# Patient Record
Sex: MALE | Employment: FULL TIME | URBAN - METROPOLITAN AREA
[De-identification: names, ages, dates, MRNs, and addresses within clinical notes are randomized per-mention and may not be internally consistent; named-entity substitution may affect disease eponyms.]

---

## 2018-05-10 ENCOUNTER — OFFICE VISIT (OUTPATIENT)
Dept: FAMILY MEDICINE | Facility: CLINIC | Age: 33
End: 2018-05-10
Payer: COMMERCIAL

## 2018-05-10 VITALS
SYSTOLIC BLOOD PRESSURE: 132 MMHG | DIASTOLIC BLOOD PRESSURE: 73 MMHG | BODY MASS INDEX: 31.78 KG/M2 | HEIGHT: 71 IN | WEIGHT: 227 LBS | HEART RATE: 89 BPM

## 2018-05-10 DIAGNOSIS — M21.42 PES PLANUS OF BOTH FEET: ICD-10-CM

## 2018-05-10 DIAGNOSIS — K21.9 GASTROESOPHAGEAL REFLUX DISEASE WITHOUT ESOPHAGITIS: ICD-10-CM

## 2018-05-10 DIAGNOSIS — M21.41 PES PLANUS OF BOTH FEET: ICD-10-CM

## 2018-05-10 DIAGNOSIS — M50.90 CERVICAL DISC DISEASE: ICD-10-CM

## 2018-05-10 DIAGNOSIS — E04.1 THYROID CYST: ICD-10-CM

## 2018-05-10 DIAGNOSIS — E55.9 VITAMIN D DEFICIENCY: ICD-10-CM

## 2018-05-10 DIAGNOSIS — R00.0 TACHYCARDIA: Primary | ICD-10-CM

## 2018-05-10 DIAGNOSIS — Z13.220 SCREENING FOR LIPID DISORDERS: ICD-10-CM

## 2018-05-10 DIAGNOSIS — Z13.1 SCREENING FOR DIABETES MELLITUS: ICD-10-CM

## 2018-05-10 PROBLEM — M21.40 FLAT FOOT: Status: ACTIVE | Noted: 2018-05-10

## 2018-05-10 PROCEDURE — 3008F BODY MASS INDEX DOCD: CPT | Mod: ,,, | Performed by: INTERNAL MEDICINE

## 2018-05-10 PROCEDURE — 99204 OFFICE O/P NEW MOD 45 MIN: CPT | Mod: ,,, | Performed by: INTERNAL MEDICINE

## 2018-05-10 RX ORDER — CALCIUM CARB/VITAMIN D3/VIT K1 500-500-40
400 TABLET,CHEWABLE ORAL DAILY
Qty: 1 CAPSULE | Refills: 1
Start: 2018-05-10 | End: 2018-07-04 | Stop reason: DRUGHIGH

## 2018-05-10 RX ORDER — ERGOCALCIFEROL 1.25 MG/1
50000 CAPSULE ORAL
COMMUNITY
End: 2018-05-10 | Stop reason: CLARIF

## 2018-05-10 NOTE — PATIENT INSTRUCTIONS
Nonsurgical Treatment of Cervical Spine Problems  Cervical spine problems can often be treated without surgery. Choices include rest, medicines, or injections. Your healthcare provider may also suggest physical therapy or certain exercises. These may all help to relieve your symptoms. These treatments are often successful. But if your symptoms dont subside, talk to your healthcare provider. He or she may tell you that surgery is your best choice.  Relieving your symptoms  Your healthcare provider may recommend:  · Medicines. These help to reduce pain and inflammation.  · Epidural steroid injections. These are injections into the spinal canal near the spinal cord. They may relieve severe pain and reduce inflammation.  · Restricting aggravating activities. This can help to give your cervical spine time to heal.  · A soft cervical collar. This can help to support your head while keeping your cervical spine aligned.  · Traction. This may help relieve pressure on the irritated nerves.  Restoring mobility and strength  Your healthcare provider may recommend that you work with a physical therapist. This can help you regain mobility and strength in your neck. Physical therapy may last for 4 to 8 weeks. It may include:  · Exercises to improve your necks range of motion and strength.  · Evaluation and correction of posture and body movements. This can correct problems that affect your cervical spine.  · Heat, massage, and traction to help relieve your symptoms.  Self-care  Youll take an active role in your own therapy. To protect your neck from further injury:  · Follow any exercise program given to you by your healthcare provider or physical therapist.  · Practice good posture while sitting, standing, or moving.  · Have your workspace evaluated. Rearrange it if needed.  · When lying down, support your neck. You can use a special cervical pillow or rolled-up towel.   Date Last Reviewed: 10/5/2015  © 0915-9082 [employer]. [address] [place] [address], [place] [address]. All rights reserved. This information is not intended as a substitute for professional medical care. Always follow your healthcare professional's instructions.        Degenerative Disk Disease    Spinal disks are gel-filled cushions between the bones, or vertebrae, of the spine. The disks act like shock absorbers. Over time, the disks may break down. This is called degenerative disk disease.  This condition can affect the neck or back. It is one of the most common causes of low back pain. It is the leading cause of disability in people under age 45 in the [place]. The pain often remains localized to the lower back or neck. Muscle spasm is often present and adds to the pain.  Disk degeneration is a natural part of aging. But it is not painful for most people. It may also occur as a result of repeated minor injuries due to daily activities, sports, or accidents. It may lead to osteoarthritis of the spine. Back pain related to disk disease may come and go. Or it may become chronic and last for months or years. The disk may bulge or rupture. This is called a slipped disk or herniated disk. That can put pressure on a nearby spinal nerve and cause neck or back pain that spreads down one arm or leg.  X-rays or an MRI may help to diagnose this condition. For acute pain, treatment includes anti-inflammatory medicines, muscle relaxants, rest, ice, or heat. Strong prescription pain medicines, called opioids, may be needed for short-term treatment if pain suddenly gets worse. Opioid medicines can be addictive. So they are not advised for long-term pain management. Other types of medicines are preferred. Surgery is generally not used to treat this condition unless there is a complication.    Home care  · For neck pain: Use a comfortable pillow that supports the head and keeps the spine in a neutral position. Your head should not be tilted forward or  backward.  · For back pain: Avoid sitting for long periods of time. This puts more stress on the lower back than standing or walking. Starting a regular exercise program to strengthen the supporting muscles of the spine will make it easier to live with degenerative disk disease.  · Apply an ice pack over the injured area for no more than 15 to 20 minutes. Do this every 3 to 6 hours for the first 24 to 48 hours. To make an ice pack, put ice cubes in a plastic bag that seals at the top. Wrap the bag in a clean, thin towel or cloth. Never put ice or an ice pack directly on the skin. Keep using ice packs to ease pain and swelling as needed. After 48 hours, apply heat (warm shower or warm bath) for 20 minutes several times a day, or switch between ice and heat.   · You may use over-the-counter pain medicine to control pain, unless another pain medicine was prescribed. If you have chronic liver or kidney disease or ever had a stomach ulcer or GI bleeding, talk with your provider before using these medicines.  Follow-up care  Follow up with your healthcare provider, or as directed.  If X-rays, a CT scan or an MRI were done, you will be notified of any new findings that may affect your care.  When to seek medical advice  Contact your healthcare provider right away if any of these occur:  · Increasing back pain  · Your foot drags when you walk, a condition called foot drop  · You have new weakness, numbness, or pain in one or both arms or legs  · Loss of bowel or bladder control  · Numbness or tingling in the buttock or groin area  Date Last Reviewed: 11/23/2015 © 2000-2017 [employer]. [address] [place] [address], [place] [address]. All rights reserved. This information is not intended as a substitute for professional medical care. Always follow your healthcare professional's instructions.        Tips to Control Acid Reflux    To control acid reflux, youll need to make some basic diet and lifestyle changes. The  simple steps outlined below may be all youll need to ease discomfort.  Watch what you eat  · Avoid fatty foods and spicy foods.  · Eat fewer acidic foods, such as citrus and tomato-based foods. These can increase symptoms.  · Limit drinking alcohol, caffeine, and fizzy beverages. All increase acid reflux.  · Try limiting chocolate, peppermint, and spearmint. These can worsen acid reflux in some people.  Watch when you eat  · Avoid lying down for 3 hours after eating.  · Do not snack before going to bed.  Raise your head  Raising your head and upper body by 4 to 6 inches helps limit reflux when youre lying down. Put blocks under the head of your bed frame to raise it.  Other changes  · Lose weight, if you need to  · Dont exercise near bedtime  · Avoid tight-fitting clothes  · Limit aspirin and ibuprofen  · Stop smoking   Date Last Reviewed: 7/1/2016  © 8611-7227 [employer]. [address] [place] [address], [place] [address]. All rights reserved. This information is not intended as a substitute for professional medical care. Always follow your healthcare professional's instructions.        Understanding Tachycardia    The heart has an electrical system that sends signals to control the heartbeat. Any abnormal change in the speed or pattern of the heartbeat is called an arrhythmia. If you have an arrhythmia that causes the heart to beat faster than normal, this is known as tachycardia. There are many types of tachycardia. They can affect the hearts upper chambers (atria), the hearts lower chambers (ventricles), or both.  What causes tachycardia?  Many things can cause tachycardia, including:  · Damage to heart tissue from heart disease, past heart attack, or heart surgery  · Abnormal electrical pathways in the heart  · Problems with the hearts structure that you are born with   · High blood pressure  · Overactive thyroid  · Use of certain medicines  · Severe blood loss or anemia  · Dehydration  · Severe  stress, fear, or anxiety  · Smoking  · Too much alcohol or caffeine  · Abuse of certain street drugs, such as cocaine  · Infections  · Certain inflammatory conditions  · Chronic  pain syndromes  What are the symptoms of tachycardia?  Tachycardia can cause a fast, pounding, or irregular heartbeat. It can also make it harder for the heart to pump blood efficiently to the body. This may cause symptoms such as:  · Shortness of breath  · Tiredness  · Dizziness or fainting  · Chest pain  Some people with tachycardia may have no symptoms at all.  How is tachycardiatreated?  Treatment for tachycardia depends on the cause. It also depends on the type you have and how severe your symptoms are. Tachycardia in the ventricles is often more serious than in the atria. For this reason, it may need to be treated right away. Possible treatments include:  · Treating the underlying cause. For instance, if a medicine is causing your tachycardia, changing the dosage or stopping the medicine with your doctors guidance may correct the problem.  · Lifestyle changes. These include getting enough sleep and reducing stress. They also include avoiding caffeine, alcohol, tobacco, and street drugs.  · Vagal maneuvers.These are techniques that may help interrupt a fast heartbeat and slow it down. One example is to take a deep breath and bear down hard while holding your breath.   · Medicines. These may be used to help slow down a fast heartbeat. They may also be used to regulate the pattern of the heartbeat.  · Electrical cardioversion. Special pads or paddles are used to send one or more brief  electrical shocks to the heart. This can help restore the heartbeat to normal.  · Ablation. A long thin tube called a catheter is inserted into a blood vessel and threaded to the heart. The catheter sends out hot or cold energy to the areas causing abnormal signals. This destroys the problem tissue or cells. This may stop certain types of  tachycardia.  · Pacemaker. This is a device that is placed just under the skin in the chest. It sends paced signals to make the heart beat at a more normal rate and rhythm. You may need this when certain medicines that treat tachycardia also result in a slow heart rate.    · Implantable cardioverter defibrillator (ICD). This is a device that is placed just under the skin in the chest or armpit. The ICD monitors your heart rate. When needed, it sends controlled burst of signals to the heart to overdrive a tachycardia.  It can also send a single stronger shock to the heart to stop a life-threatening type of tachycardia, if needed.  · Surgery. During surgery, different techniques may be used to create scar tissue in the areas of the heart causing abnormal signals. This may help stop certain types of tachycardia.  What are the complications of tachycardia?  These can include:  · Blood clots or stroke  · Heart failure. With this problem, the heart muscle is so weak it no longer pumps blood well.  · Sudden cardiac arrest. This is when the heart suddenly stops beating.  When should I call my healthcare provider?  Call your healthcare provider right away if you have any of these:  · Symptoms that dont get better with treatment, or get worse  · New symptoms   Date Last Reviewed: 5/1/2016  © 8735-7471 [employer]. [address] [place] [address], [place] [address]. All rights reserved. This information is not intended as a substitute for professional medical care. Always follow your healthcare professional's instructions.

## 2018-05-10 NOTE — PROGRESS NOTES
Subjective:       Patient ID: Patrick Branch is a 32 y.o. male.    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 He has come to establish with a primary care physician. All the last year or 2 he has noted that intermittently he gets palpitations and feels tachycardic. There is no particular chest tightness for pain. He is to drink 3 or 4 cups of coffee a day but he has started cutting down on it. No caffeinated beverages except for Sprite which does not have caffeine.    He has a apple watch which had registered a pulse of approximately 116 on one of those days when he was sitting quietly. He did not feel any palpitations or flutter at that point.    Thus far he is not known to have hypertension or any valvular heart disease.    Patient also experiences tingling in the fingers and some tightness and pain in the neck. This is not related to exertion. He does not have any history of injury or fall or trauma. No bowel or bladder disturbance. No weakness in the 80s.    He also has chronic reflux like symptoms which might have been aggravated by caffeine. He takes Zantac 150 minutes twice a day to circumvent the symptoms.    He denies any sleep apnea-like symptoms. Neither he has felt that his nodes at night nor his wife has pointed out that to him.    He has been under some stress being a hospitalist and also an immigrant in this country. His wife delivered approximately 3 months back. The baby is doing well. His wife is also a physician.      Thyroid Problem   Symptoms include palpitations. Patient reports no anxiety, cold intolerance, constipation, diarrhea, fatigue, heat intolerance, visual change or weight loss.   Gastroesophageal Reflux   He complains of heartburn. He reports no abdominal pain, no chest pain or no coughing. This is a chronic  problem. The current episode started more than 1 year ago. The problem has been waxing and waning. Pertinent negatives include no fatigue or weight loss. Risk factors include obesity and caffeine use. He has tried a histamine-2 antagonist for the symptoms. The treatment provided moderate relief. Past procedures do not include an abdominal ultrasound or an EGD.   Neck Pain    This is a chronic problem. The current episode started more than 1 year ago. The problem has been waxing and waning. The pain is at a severity of 2/10. The pain is mild. Associated symptoms include tingling. Pertinent negatives include no chest pain, numbness, photophobia, trouble swallowing, visual change, weakness or weight loss. He has tried nothing for the symptoms.   Palpitations    This is a chronic problem. The current episode started more than 1 year ago. The problem occurs intermittently. The problem has been waxing and waning. The symptoms are aggravated by caffeine. Pertinent negatives include no anxiety, chest pain, coughing, dizziness, irregular heartbeat, malaise/fatigue, numbness, shortness of breath or weakness. He has tried nothing for the symptoms. Risk factors include obesity, sedentary lifestyle and being male. His past medical history is significant for anxiety. There is no history of anemia, drug use, heart disease, hyperthyroidism or a valve disorder.       Past Medical History:   Diagnosis Date    GERD (gastroesophageal reflux disease)     Hyperlipidemia     Thyroid disease      Social History     Social History    Marital status:      Spouse name: N/A    Number of children: 1    Years of education: N/A     Occupational History    Physician  Pending sale to [employer]     Social History Main Topics    Smoking status: Never Smoker    Smokeless tobacco: Never Used    Alcohol use No    Drug use: No    Sexual activity: Yes     Partners: Female     Other Topics Concern    Not on file      Social History Narrative    No narrative on file     Past Surgical History:   Procedure Laterality Date    NASAL SINUS SURGERY      FES    TONSILLECTOMY       Family History   Problem Relation Age of Onset    Diabetes Mother     Hyperlipidemia Mother     Hypertension Mother     Thyroid cancer Mother     Heart disease Father         Sinus Tachycardia, NO CAD , No Valvular disease    IgA nephropathy Sister        Review of Systems   Constitutional: Negative for activity change, appetite change, fatigue, malaise/fatigue, unexpected weight change and weight loss.   HENT: Negative for congestion, drooling, sneezing and trouble swallowing.    Eyes: Negative for photophobia, pain and visual disturbance.   Respiratory: Negative for cough, chest tightness and shortness of breath.    Cardiovascular: Positive for palpitations. Negative for chest pain and leg swelling.        Tachycardia and palpitations.   Gastrointestinal: Positive for heartburn. Negative for abdominal distention, abdominal pain, blood in stool, constipation and diarrhea.        GERD symptoms.   Endocrine: Negative for cold intolerance, heat intolerance, polydipsia, polyphagia and polyuria.        Thyroid cyst   Genitourinary: Negative for dysuria, hematuria and scrotal swelling.   Musculoskeletal: Positive for neck pain. Negative for arthralgias, back pain and gait problem.        C spine  Stenosis   Skin: Negative for pallor, rash and wound.   Allergic/Immunologic: Negative for environmental allergies, food allergies and immunocompromised state.   Neurological: Positive for tingling. Negative for dizziness, seizures, speech difficulty, weakness, light-headedness and numbness.   Hematological: Negative for adenopathy. Does not bruise/bleed easily.   Psychiatric/Behavioral: Negative for agitation, behavioral problems and confusion. The patient is not nervous/anxious.        Objective:       Vitals:    05/10/18 1445   BP: 132/73   Pulse: 89  "  Weight: 103 kg (227 lb)   Height: 5' 11" (1.803 m)     Physical Exam   Constitutional: He is oriented to person, place, and time. He appears well-developed.   HENT:   Head: Normocephalic and atraumatic.   Nose: Nose normal.   Mouth/Throat: Oropharynx is clear and moist. No oropharyngeal exudate.   Eyes: Conjunctivae and EOM are normal.   Neck: Normal range of motion. Neck supple. No hepatojugular reflux and no JVD present. No tracheal deviation present. No thyromegaly present.   No definite nodule felt in the thyroid though slight full list and irregularity is appreciated. But this is a biased examination.    Oral airways are Malampati score 3.   Cardiovascular: Normal rate, regular rhythm and normal heart sounds.  Exam reveals no gallop and no friction rub.    No murmur heard.  Pulmonary/Chest: Effort normal and breath sounds normal. No respiratory distress. He has no wheezes. He has no rales.   Abdominal: Soft. Bowel sounds are normal. He exhibits no distension. There is no tenderness.   Musculoskeletal: Normal range of motion.        Right foot: There is deformity. Decreased range of motion: pes planus.        Left foot: There is deformity (pes planus).   Feet:   Right Foot:   Skin Integrity: Negative for ulcer or blister.   Left Foot:   Skin Integrity: Negative for ulcer or blister.   Neurological: He is alert and oriented to person, place, and time. He displays no atrophy and no tremor. No sensory deficit. He exhibits normal muscle tone.   Reflex Scores:       Tricep reflexes are 1+ on the right side and 1+ on the left side.       Bicep reflexes are 2+ on the right side and 2+ on the left side.       Brachioradialis reflexes are 0 on the right side and 0 on the left side.       Patellar reflexes are 3+ on the right side and 3+ on the left side.       Achilles reflexes are 2+ on the right side and 2+ on the left side.  Compression of the vertex did not recently tenderness in the neck or extremities. Patient's " motor strength is good. Sensations are intact. Hyperpigmented skin with uncertain diagnosis.   Skin: Skin is warm and dry.        Patient has a hyperpigmented regions on the abdominal wall and also in the upper extremities and thighs. No firm diagnosis has been made.    Patient has lipomas.   Psychiatric: He has a normal mood and affect.   Nursing note and vitals reviewed.      Assessment:       1. Tachycardia    2. Cervical disc disease    3. Gastroesophageal reflux disease without esophagitis    4. Thyroid cyst    5. Screening for lipid disorders    6. Vitamin D deficiency    7. Screening for diabetes mellitus    8. Pes planus of both feet         Plan:           Tachycardia  -     CBC auto differential; Future; Expected date: 05/10/2018  -     Comprehensive metabolic panel; Future; Expected date: 05/10/2018    Cervical disc disease  -     Ambulatory Referral to Physical/Occupational Therapy    Gastroesophageal reflux disease without esophagitis    Thyroid cyst  -     TSH; Future; Expected date: 05/10/2018  -     T4, free; Future; Expected date: 05/10/2018  -     US Soft Tissue Head Neck Thyroid; Future; Expected date: 05/10/2018  -     Thyroid peroxidase antibody; Future; Expected date: 05/10/2018    Screening for lipid disorders  -     Lipid panel; Future; Expected date: 05/10/2018    Vitamin D deficiency  -     cholecalciferol, vitamin D3, (VITAMIN D3) 400 unit Cap; Take 1 capsule (400 Units total) by mouth once daily.  Dispense: 1 capsule; Refill: 1  -     Vitamin D; Future; Expected date: 05/10/2018    Screening for diabetes mellitus  -     Hemoglobin A1c; Future; Expected date: 05/10/2018    Pes planus of both feet    Patient's heart rate seems to be stable in the office. Certainly caffeine can contribute to fast heart rate. This seems to be a family history of tachycardia and father. I'll check a baseline thyroid function test to see for any abnormality.    I've advised him to check for any possible sleep  apnea symptoms at night like snoring. His oral airways are slightly crowded Malampati score 3.    Patient has been advised to continue exercise, cardio and weight management. His BMI is 31 and I would recommend a target of weight less than 200 pounds.  He does have some hyperpigmented areas on the skin and I'm not sure if we are dealing with a cantholysis nigricans which might be indicative of some disc metabolic syndrome.    He will go through some physical therapy for his neck as there are no hard core neurological findings. Let's see how he does before we consider imaging.      I would advise him to cut down on caffeinated beverages like coffee. Incorporate more greens and vegetables in his diet. Try to avoid fried and fatty food. Try to cut down on starches.    Follow-up in 6 months or earlier.

## 2018-06-05 LAB
ALBUMIN SERPL-MCNC: 4.4 G/DL (ref 3.1–4.7)
ALP SERPL-CCNC: 80 IU/L (ref 40–104)
ALT (SGPT): 24 IU/L (ref 3–33)
AST SERPL-CCNC: 21 IU/L (ref 10–40)
BASOPHILS NFR BLD: 0.1 K/UL (ref 0–0.2)
BASOPHILS NFR BLD: 0.7 %
BILIRUB SERPL-MCNC: 0.9 MG/DL (ref 0.3–1)
BUN SERPL-MCNC: 12 MG/DL (ref 8–20)
CALCIUM SERPL-MCNC: 9.3 MG/DL (ref 7.7–10.4)
CHLORIDE: 103 MMOL/L (ref 98–110)
CO2 SERPL-SCNC: 28 MMOL/L (ref 22.8–31.6)
CREATININE: 1.11 MG/DL (ref 0.6–1.4)
EOSINOPHIL NFR BLD: 0.2 K/UL (ref 0–0.7)
EOSINOPHIL NFR BLD: 2.7 %
ERYTHROCYTE [DISTWIDTH] IN BLOOD BY AUTOMATED COUNT: 12.1 % (ref 11.7–14.9)
GLUCOSE: 86 MG/DL (ref 70–99)
GRAN #: 4 K/UL (ref 1.4–6.5)
GRAN%: 45.6 %
HBA1C MFR BLD: 5.3 % (ref 3.1–6.5)
HCT VFR BLD AUTO: 43.7 % (ref 39–55)
HGB BLD-MCNC: 14.7 G/DL (ref 14–16)
IMMATURE GRANS (ABS): 0 K/UL (ref 0–1)
IMMATURE GRANULOCYTES: 0.2 %
LYMPH #: 3.8 K/UL (ref 1.2–3.4)
LYMPH%: 43.8 %
MCH RBC QN AUTO: 30 PG (ref 25–35)
MCHC RBC AUTO-ENTMCNC: 33.6 G/DL (ref 31–36)
MCV RBC AUTO: 89.2 FL (ref 80–100)
MONO #: 0.6 K/UL (ref 0.1–0.6)
MONO%: 7 %
NUCLEATED RBCS: 0 %
PLATELET # BLD AUTO: 252 K/UL (ref 140–440)
PMV BLD AUTO: 10.3 FL (ref 8.8–12.7)
POTASSIUM SERPL-SCNC: 3.8 MMOL/L (ref 3.5–5)
PROT SERPL-MCNC: 7.1 G/DL (ref 6–8.2)
RBC # BLD AUTO: 4.9 M/UL (ref 4.3–5.9)
SODIUM: 139 MMOL/L (ref 134–144)
T4 FREE SP9 P CHAL SERPL-SCNC: 0.96 NG/DL (ref 0.45–1.27)
TSH SERPL DL<=0.005 MIU/L-ACNC: 2.38 ULU/ML (ref 0.3–5.6)
VITAMIN D, 1,25 (OH)2: 18.9 NG/ML (ref 30–100)
WBC # BLD AUTO: 8.7 K/UL (ref 5–10)

## 2018-07-04 DIAGNOSIS — E55.9 VITAMIN D DEFICIENCY: Primary | ICD-10-CM

## 2018-07-04 RX ORDER — ERGOCALCIFEROL 1.25 MG/1
50000 CAPSULE ORAL
Qty: 12 CAPSULE | Refills: 0 | Status: SHIPPED | OUTPATIENT
Start: 2018-07-04

## 2018-08-13 PROBLEM — Z13.1 SCREENING FOR DIABETES MELLITUS: Status: RESOLVED | Noted: 2018-05-10 | Resolved: 2018-08-13
